# Patient Record
Sex: FEMALE | Race: OTHER | Employment: FULL TIME | URBAN - METROPOLITAN AREA
[De-identification: names, ages, dates, MRNs, and addresses within clinical notes are randomized per-mention and may not be internally consistent; named-entity substitution may affect disease eponyms.]

---

## 2018-07-19 ENCOUNTER — HOSPITAL ENCOUNTER (OUTPATIENT)
Dept: MAMMOGRAPHY | Age: 43
Discharge: HOME | End: 2018-07-19
Attending: INTERNAL MEDICINE
Payer: MEDICAID

## 2018-07-19 ENCOUNTER — OFFICE VISIT (OUTPATIENT)
Dept: INTERNAL MEDICINE CLINIC | Facility: CLINIC | Age: 43
End: 2018-07-19
Payer: MEDICAID

## 2018-07-19 ENCOUNTER — LAB ENCOUNTER (OUTPATIENT)
Dept: LAB | Facility: HOSPITAL | Age: 43
End: 2018-07-19
Attending: INTERNAL MEDICINE
Payer: MEDICAID

## 2018-07-19 VITALS
WEIGHT: 186.69 LBS | SYSTOLIC BLOOD PRESSURE: 112 MMHG | HEART RATE: 83 BPM | BODY MASS INDEX: 32.67 KG/M2 | DIASTOLIC BLOOD PRESSURE: 79 MMHG | HEIGHT: 63.5 IN | TEMPERATURE: 98 F

## 2018-07-19 DIAGNOSIS — Z00.00 ANNUAL PHYSICAL EXAM: Primary | ICD-10-CM

## 2018-07-19 DIAGNOSIS — Z12.4 SCREENING FOR CERVICAL CANCER: ICD-10-CM

## 2018-07-19 DIAGNOSIS — R92.2 DENSE BREAST: ICD-10-CM

## 2018-07-19 DIAGNOSIS — Z12.39 SCREENING FOR BREAST CANCER: ICD-10-CM

## 2018-07-19 PROBLEM — Z80.3 FAMILY HISTORY OF BREAST CANCER IN MOTHER: Status: ACTIVE | Noted: 2018-07-19

## 2018-07-19 LAB
ALBUMIN SERPL BCP-MCNC: 3.9 G/DL (ref 3.5–4.8)
ALBUMIN/GLOB SERPL: 1.1 {RATIO} (ref 1–2)
ALP SERPL-CCNC: 65 U/L (ref 32–100)
ALT SERPL-CCNC: 19 U/L (ref 14–54)
ANION GAP SERPL CALC-SCNC: 6 MMOL/L (ref 0–18)
AST SERPL-CCNC: 21 U/L (ref 15–41)
BASOPHILS # BLD: 0.1 K/UL (ref 0–0.2)
BASOPHILS NFR BLD: 1 %
BILIRUB SERPL-MCNC: 0.5 MG/DL (ref 0.3–1.2)
BUN SERPL-MCNC: 12 MG/DL (ref 8–20)
BUN/CREAT SERPL: 20.7 (ref 10–20)
CALCIUM SERPL-MCNC: 9 MG/DL (ref 8.5–10.5)
CHLORIDE SERPL-SCNC: 104 MMOL/L (ref 95–110)
CHOLEST SERPL-MCNC: 195 MG/DL (ref 110–200)
CO2 SERPL-SCNC: 27 MMOL/L (ref 22–32)
CREAT SERPL-MCNC: 0.58 MG/DL (ref 0.5–1.5)
EOSINOPHIL # BLD: 0.2 K/UL (ref 0–0.7)
EOSINOPHIL NFR BLD: 3 %
ERYTHROCYTE [DISTWIDTH] IN BLOOD BY AUTOMATED COUNT: 13.4 % (ref 11–15)
GLOBULIN PLAS-MCNC: 3.6 G/DL (ref 2.5–3.7)
GLUCOSE SERPL-MCNC: 93 MG/DL (ref 70–99)
HCT VFR BLD AUTO: 41.3 % (ref 35–48)
HDLC SERPL-MCNC: 54 MG/DL
HGB BLD-MCNC: 13.7 G/DL (ref 12–16)
LDLC SERPL CALC-MCNC: 122 MG/DL (ref 0–99)
LYMPHOCYTES # BLD: 1.7 K/UL (ref 1–4)
LYMPHOCYTES NFR BLD: 31 %
MCH RBC QN AUTO: 30.6 PG (ref 27–32)
MCHC RBC AUTO-ENTMCNC: 33.2 G/DL (ref 32–37)
MCV RBC AUTO: 92.1 FL (ref 80–100)
MONOCYTES # BLD: 0.4 K/UL (ref 0–1)
MONOCYTES NFR BLD: 7 %
NEUTROPHILS # BLD AUTO: 3.3 K/UL (ref 1.8–7.7)
NEUTROPHILS NFR BLD: 58 %
NONHDLC SERPL-MCNC: 141 MG/DL
OSMOLALITY UR CALC.SUM OF ELEC: 283 MOSM/KG (ref 275–295)
PATIENT FASTING: YES
PLATELET # BLD AUTO: 219 K/UL (ref 140–400)
PMV BLD AUTO: 9.2 FL (ref 7.4–10.3)
POTASSIUM SERPL-SCNC: 4 MMOL/L (ref 3.3–5.1)
PROT SERPL-MCNC: 7.5 G/DL (ref 5.9–8.4)
RBC # BLD AUTO: 4.48 M/UL (ref 3.7–5.4)
SODIUM SERPL-SCNC: 137 MMOL/L (ref 136–144)
TRIGL SERPL-MCNC: 93 MG/DL (ref 1–149)
TSH SERPL-ACNC: 0.9 UIU/ML (ref 0.45–5.33)
WBC # BLD AUTO: 5.6 K/UL (ref 4–11)

## 2018-07-19 PROCEDURE — 80061 LIPID PANEL: CPT

## 2018-07-19 PROCEDURE — 36415 COLL VENOUS BLD VENIPUNCTURE: CPT

## 2018-07-19 PROCEDURE — 85025 COMPLETE CBC W/AUTO DIFF WBC: CPT

## 2018-07-19 PROCEDURE — 84443 ASSAY THYROID STIM HORMONE: CPT

## 2018-07-19 PROCEDURE — 80053 COMPREHEN METABOLIC PANEL: CPT

## 2018-07-19 PROCEDURE — 77063 BREAST TOMOSYNTHESIS BI: CPT | Performed by: INTERNAL MEDICINE

## 2018-07-19 PROCEDURE — 77067 SCR MAMMO BI INCL CAD: CPT | Performed by: INTERNAL MEDICINE

## 2018-07-19 PROCEDURE — 99386 PREV VISIT NEW AGE 40-64: CPT | Performed by: INTERNAL MEDICINE

## 2018-07-19 NOTE — PROGRESS NOTES
Liv Landry is a 44year old female. Patient presents with:  Physical      HPI:     HPI  Patient is a 46-year-old female here as a new patient for physical and pap. She denies any complaints.   She has history of vitamin D deficiency in the past for wh well-nourished. No distress. HENT:   Head: Normocephalic and atraumatic. Right Ear: External ear normal.   Left Ear: External ear normal.   Nose: Nose normal.   Mouth/Throat: No oropharyngeal exudate.    Eyes: Conjunctivae and EOM are normal. Pupils are Psychiatric: She has a normal mood and affect.          ASSESSMENT AND PLAN:     Problem List Items Addressed This Visit        Other    Annual physical exam - Primary    Relevant Orders    CBC WITH DIFFERENTIAL WITH PLATELET (Completed)    COMP METABOLIC

## 2018-07-23 ENCOUNTER — TELEPHONE (OUTPATIENT)
Dept: OTHER | Age: 43
End: 2018-07-23

## 2018-07-23 DIAGNOSIS — R92.2 DENSE BREAST TISSUE: Primary | ICD-10-CM

## 2018-07-23 NOTE — TELEPHONE ENCOUNTER
Spoke with patient (identified name and ) ,results reviewed and agrees with  Plan. States that she wants to do the [address] for her breasts, Central scheduling  Number given [phone], advised to call tomorrow.     DR MCCAIN=patient wasn't the [place] , pended order

## 2018-07-25 LAB — HPV I/H RISK 1 DNA SPEC QL NAA+PROBE: NEGATIVE

## 2019-05-28 ENCOUNTER — HOSPITAL ENCOUNTER (OUTPATIENT)
Age: 44
Discharge: HOME | End: 2019-05-28
Attending: EMERGENCY MEDICINE
Payer: COMMERCIAL

## 2019-05-28 VITALS
SYSTOLIC BLOOD PRESSURE: 120 MMHG | WEIGHT: 180 LBS | TEMPERATURE: 98 F | RESPIRATION RATE: 17 BRPM | HEART RATE: 92 BPM | HEIGHT: 62 IN | OXYGEN SATURATION: 100 % | DIASTOLIC BLOOD PRESSURE: 83 MMHG | BODY MASS INDEX: 33.13 KG/M2

## 2019-05-28 DIAGNOSIS — L02.91 ABSCESS: Primary | ICD-10-CM

## 2019-05-28 PROCEDURE — 10061 I&D ABSCESS COMP/MULTIPLE: CPT

## 2019-05-28 PROCEDURE — 87184 SC STD DISK METHOD PER PLATE: CPT | Performed by: EMERGENCY MEDICINE

## 2019-05-28 PROCEDURE — 99204 OFFICE O/P NEW MOD 45 MIN: CPT

## 2019-05-28 PROCEDURE — 87205 SMEAR GRAM STAIN: CPT | Performed by: EMERGENCY MEDICINE

## 2019-05-28 PROCEDURE — 87186 SC STD MICRODIL/AGAR DIL: CPT | Performed by: EMERGENCY MEDICINE

## 2019-05-28 PROCEDURE — 87070 CULTURE OTHR SPECIMN AEROBIC: CPT | Performed by: EMERGENCY MEDICINE

## 2019-05-28 PROCEDURE — 99214 OFFICE O/P EST MOD 30 MIN: CPT

## 2019-05-28 PROCEDURE — 87077 CULTURE AEROBIC IDENTIFY: CPT | Performed by: EMERGENCY MEDICINE

## 2019-05-28 RX ORDER — NAPROXEN 500 MG/1
500 TABLET ORAL 2 TIMES DAILY WITH MEALS
Qty: 20 TABLET | Refills: 0 | Status: SHIPPED | OUTPATIENT
Start: 2019-05-28 | End: 2019-06-04

## 2019-05-28 RX ORDER — CLINDAMYCIN HYDROCHLORIDE 300 MG/1
300 CAPSULE ORAL 3 TIMES DAILY
Qty: 30 CAPSULE | Refills: 0 | Status: SHIPPED | OUTPATIENT
Start: 2019-05-28 | End: 2019-06-07

## 2019-05-28 NOTE — ED PROVIDER NOTES
Patient Seen in: [facility] In [address] [place] [address]    History   Patient presents with:  Abscess (integumentary)    Stated Complaint: back abscess    HPI  Patient complains of skin infection for 7 days. Located on back.   Describes as painful cooperative,          ED Course     Labs Reviewed   AEROBIC BACTERIAL CULTURE       MDM             Procedures:    Abscess drainage: The patient abscess was located right upper back.    I obtained verbal consent from the patient to drain the abscess who wa

## 2019-05-30 ENCOUNTER — HOSPITAL ENCOUNTER (OUTPATIENT)
Age: 44
Discharge: HOME | End: 2019-05-30
Attending: EMERGENCY MEDICINE
Payer: COMMERCIAL

## 2019-05-30 VITALS
HEART RATE: 84 BPM | RESPIRATION RATE: 20 BRPM | HEIGHT: 63 IN | OXYGEN SATURATION: 98 % | SYSTOLIC BLOOD PRESSURE: 110 MMHG | BODY MASS INDEX: 31.89 KG/M2 | DIASTOLIC BLOOD PRESSURE: 76 MMHG | TEMPERATURE: 98 F | WEIGHT: 180 LBS

## 2019-05-30 DIAGNOSIS — Z51.89 WOUND CHECK, ABSCESS: Primary | ICD-10-CM

## 2019-05-30 PROCEDURE — 99211 OFF/OP EST MAY X REQ PHY/QHP: CPT

## 2019-05-31 NOTE — ED PROVIDER NOTES
Patient Seen in: [facility] In [address] [place] [address]    History   Patient presents with:  Laceration Abrasion (integumentary)    Stated Complaint: wound check    HPI  Patient complains of skin infection for  6 days. Located back.  History revie intact redness still there  PSYCH: calm, cooperative,          ED Course   Labs Reviewed - No data to display    MDM             Procedures:    Abscess unpacked            Disposition and Plan     Clinical Impression:  Wound check, abscess  (primary encoun

## 2024-12-10 ENCOUNTER — LAB ENCOUNTER (OUTPATIENT)
Dept: LAB | Age: 49
End: 2024-12-10
Attending: FAMILY MEDICINE
Payer: COMMERCIAL

## 2024-12-10 ENCOUNTER — OFFICE VISIT (OUTPATIENT)
Dept: FAMILY MEDICINE CLINIC | Facility: CLINIC | Age: 49
End: 2024-12-10
Payer: COMMERCIAL

## 2024-12-10 VITALS
HEART RATE: 103 BPM | BODY MASS INDEX: 27.82 KG/M2 | HEIGHT: 63 IN | WEIGHT: 157 LBS | SYSTOLIC BLOOD PRESSURE: 122 MMHG | DIASTOLIC BLOOD PRESSURE: 81 MMHG

## 2024-12-10 DIAGNOSIS — Z11.3 SCREENING EXAMINATION FOR STD (SEXUALLY TRANSMITTED DISEASE): ICD-10-CM

## 2024-12-10 DIAGNOSIS — Z80.3 FAMILY HISTORY OF BREAST CANCER IN MOTHER: ICD-10-CM

## 2024-12-10 DIAGNOSIS — Z00.00 WELL ADULT EXAM: ICD-10-CM

## 2024-12-10 DIAGNOSIS — Z12.31 ENCOUNTER FOR SCREENING MAMMOGRAM FOR BREAST CANCER: ICD-10-CM

## 2024-12-10 DIAGNOSIS — N93.9 VAGINAL BLEEDING: Primary | ICD-10-CM

## 2024-12-10 DIAGNOSIS — Z01.419 ENCOUNTER FOR GYNECOLOGICAL EXAMINATION: ICD-10-CM

## 2024-12-10 DIAGNOSIS — N93.0 POSTCOITAL BLEEDING: ICD-10-CM

## 2024-12-10 DIAGNOSIS — Z80.3 FH: BREAST CANCER IN FIRST DEGREE RELATIVE: ICD-10-CM

## 2024-12-10 LAB
ALBUMIN SERPL-MCNC: 4.6 G/DL (ref 3.2–4.8)
ALBUMIN/GLOB SERPL: 1.6 {RATIO} (ref 1–2)
ALP LIVER SERPL-CCNC: 66 U/L
ALT SERPL-CCNC: 22 U/L
ANION GAP SERPL CALC-SCNC: 6 MMOL/L (ref 0–18)
AST SERPL-CCNC: 23 U/L (ref ?–34)
BASOPHILS # BLD AUTO: 0.07 X10(3) UL (ref 0–0.2)
BASOPHILS NFR BLD AUTO: 1 %
BILIRUB SERPL-MCNC: 0.4 MG/DL (ref 0.3–1.2)
BUN BLD-MCNC: 10 MG/DL (ref 9–23)
BUN/CREAT SERPL: 13.9 (ref 10–20)
CALCIUM BLD-MCNC: 9.6 MG/DL (ref 8.7–10.4)
CHLORIDE SERPL-SCNC: 108 MMOL/L (ref 98–112)
CHOLEST SERPL-MCNC: 181 MG/DL (ref ?–200)
CO2 SERPL-SCNC: 27 MMOL/L (ref 21–32)
CREAT BLD-MCNC: 0.72 MG/DL
DEPRECATED RDW RBC AUTO: 46.1 FL (ref 35.1–46.3)
EGFRCR SERPLBLD CKD-EPI 2021: 103 ML/MIN/1.73M2 (ref 60–?)
EOSINOPHIL # BLD AUTO: 0.35 X10(3) UL (ref 0–0.7)
EOSINOPHIL NFR BLD AUTO: 4.8 %
ERYTHROCYTE [DISTWIDTH] IN BLOOD BY AUTOMATED COUNT: 13.3 % (ref 11–15)
FASTING PATIENT LIPID ANSWER: NO
FASTING STATUS PATIENT QL REPORTED: NO
GLOBULIN PLAS-MCNC: 2.9 G/DL (ref 2–3.5)
GLUCOSE BLD-MCNC: 85 MG/DL (ref 70–99)
HCT VFR BLD AUTO: 39.1 %
HDLC SERPL-MCNC: 67 MG/DL (ref 40–59)
HGB BLD-MCNC: 13.1 G/DL
IMM GRANULOCYTES # BLD AUTO: 0.02 X10(3) UL (ref 0–1)
IMM GRANULOCYTES NFR BLD: 0.3 %
LDLC SERPL CALC-MCNC: 94 MG/DL (ref ?–100)
LYMPHOCYTES # BLD AUTO: 2.18 X10(3) UL (ref 1–4)
LYMPHOCYTES NFR BLD AUTO: 29.7 %
MCH RBC QN AUTO: 31.4 PG (ref 26–34)
MCHC RBC AUTO-ENTMCNC: 33.5 G/DL (ref 31–37)
MCV RBC AUTO: 93.8 FL
MONOCYTES # BLD AUTO: 0.54 X10(3) UL (ref 0.1–1)
MONOCYTES NFR BLD AUTO: 7.4 %
NEUTROPHILS # BLD AUTO: 4.17 X10 (3) UL (ref 1.5–7.7)
NEUTROPHILS # BLD AUTO: 4.17 X10(3) UL (ref 1.5–7.7)
NEUTROPHILS NFR BLD AUTO: 56.8 %
NONHDLC SERPL-MCNC: 114 MG/DL (ref ?–130)
OSMOLALITY SERPL CALC.SUM OF ELEC: 290 MOSM/KG (ref 275–295)
PLATELET # BLD AUTO: 263 10(3)UL (ref 150–450)
POTASSIUM SERPL-SCNC: 4.1 MMOL/L (ref 3.5–5.1)
PROT SERPL-MCNC: 7.5 G/DL (ref 5.7–8.2)
RBC # BLD AUTO: 4.17 X10(6)UL
SODIUM SERPL-SCNC: 141 MMOL/L (ref 136–145)
TRIGL SERPL-MCNC: 115 MG/DL (ref 30–149)
TSI SER-ACNC: 0.09 UIU/ML (ref 0.55–4.78)
VLDLC SERPL CALC-MCNC: 19 MG/DL (ref 0–30)
WBC # BLD AUTO: 7.3 X10(3) UL (ref 4–11)

## 2024-12-10 PROCEDURE — 85025 COMPLETE CBC W/AUTO DIFF WBC: CPT

## 2024-12-10 PROCEDURE — 83036 HEMOGLOBIN GLYCOSYLATED A1C: CPT

## 2024-12-10 PROCEDURE — 84481 FREE ASSAY (FT-3): CPT

## 2024-12-10 PROCEDURE — 80053 COMPREHEN METABOLIC PANEL: CPT

## 2024-12-10 PROCEDURE — 80061 LIPID PANEL: CPT

## 2024-12-10 PROCEDURE — 36415 COLL VENOUS BLD VENIPUNCTURE: CPT

## 2024-12-10 PROCEDURE — 84443 ASSAY THYROID STIM HORMONE: CPT

## 2024-12-10 PROCEDURE — 84439 ASSAY OF FREE THYROXINE: CPT

## 2024-12-10 PROCEDURE — 99204 OFFICE O/P NEW MOD 45 MIN: CPT | Performed by: FAMILY MEDICINE

## 2024-12-10 RX ORDER — PHENTERMINE HYDROCHLORIDE 30 MG/1
30 CAPSULE ORAL EVERY MORNING
COMMUNITY

## 2024-12-10 NOTE — PROGRESS NOTES
HPI:    Patient ID: Kit Gomez is a 49 year old female.      Menstrual Problem  The patient's pertinent negatives include no pelvic pain or vaginal discharge.       Chief Complaint   Patient presents with    Menstrual Problem     Irregular spotting states she gets a full period and then  spotting also spotting after intercourse X 3 months          Wt Readings from Last 6 Encounters:   12/10/24 157 lb (71.2 kg)   05/30/19 180 lb (81.6 kg)   05/28/19 180 lb (81.6 kg)   07/19/18 186 lb 11.2 oz (84.7 kg)     BP Readings from Last 3 Encounters:   12/10/24 122/81   05/30/19 110/76   05/28/19 120/83     Patient new to me   Referred to me from her sister.  Has not seen pcp for a while    In a relationship  3 kids- 23, 20,11 yrs.    Non smoker    Patient thinks maybe ? Perimenopausal  Having  irregular menses since 3 months  Would get full menses, lasting 5 days, was getting spotting between.    Gets spotting after sexual intercourse recently    Hx of abnormal paps maybe 2010  Last pap 8 yrs,    Taking a diet pill      Review of Systems   Genitourinary:  Positive for menstrual problem and vaginal bleeding. Negative for pelvic pain, vaginal discharge and vaginal pain.       /81   Pulse 103   Ht 5' 3\" (1.6 m)   Wt 157 lb (71.2 kg)   LMP 11/18/2024 (Exact Date)   BMI 27.81 kg/m²          Current Outpatient Medications   Medication Sig Dispense Refill    Phentermine HCl 30 MG Oral Cap Take 1 capsule (30 mg total) by mouth every morning.       Allergies:Allergies[1]   PHYSICAL EXAM:     Chief Complaint   Patient presents with    Menstrual Problem     Irregular spotting states she gets a full period and then  spotting also spotting after intercourse X 3 months         Physical Exam  Constitutional:       Appearance: She is well-developed.   Neck:      Thyroid: No thyromegaly.   Cardiovascular:      Rate and Rhythm: Normal rate and regular rhythm.      Heart sounds: Normal heart sounds.   Chest:   Breasts:      Breasts are symmetrical.      Right: Normal.      Left: Normal.   Abdominal:      General: Bowel sounds are normal.      Palpations: Abdomen is soft.   Genitourinary:     Labia:         Right: No rash, tenderness, lesion or injury.         Left: No rash, tenderness, lesion or injury.       Vagina: Normal. No vaginal discharge.      Cervix: Friability and cervical bleeding present. No cervical motion tenderness, discharge, lesion, erythema or eversion.      Adnexa:         Right: No mass, tenderness or fullness.          Left: No mass, tenderness or fullness.        Comments: Pap done  Musculoskeletal:      Cervical back: Normal range of motion and neck supple.   Lymphadenopathy:      Upper Body:      Right upper body: No supraclavicular or axillary adenopathy.      Left upper body: No supraclavicular or axillary adenopathy.                ASSESSMENT/PLAN:     Encounter Diagnoses   Name Primary?    Vaginal bleeding Yes    Postcoital bleeding     Family history of breast cancer in mother     FH: breast cancer in first degree relative     Encounter for screening mammogram for breast cancer     Encounter for gynecological examination     Well adult exam     Screening examination for STD (sexually transmitted disease)        1. Vaginal bleeding    - US PELVIS (TRANSABDOMINAL AND TRANSVAGINAL) (CPT=76856/38744); Future    2. Postcoital bleeding    - US PELVIS (TRANSABDOMINAL AND TRANSVAGINAL) (CPT=76856/19358); Future    3. Family history of breast cancer in mother      4. FH: breast cancer in first degree relative      5. Encounter for screening mammogram for breast cancer    - [facility] FIDENCIO 2D+3D SCREENING BILAT (CPT=77067/88473); Future    6. Encounter for gynecological examination  Pelvic and breast exam done  - ThinPrep PAP with HPV Reflex Request B; Future    7. Well adult exam  Follow up physical   - CBC With Differential With Platelet; Future  - Comp Metabolic Panel (14); Future  - Lipid Panel; Future  - TSH W Reflex  To Free T4; Future  - Hemoglobin A1C; Future    8. Screening examination for STD (sexually transmitted disease)    - Chlamydia/Gc Amplification; Future      Orders Placed This Encounter   Procedures    CBC With Differential With Platelet    Comp Metabolic Panel (14)    Lipid Panel    TSH W Reflex To Free T4    Hemoglobin A1C    ThinPrep PAP with HPV Reflex Request B    Chlamydia/Gc Amplification         The above note was creating using Dragon speech recognition technology. Please excuse any typos    Meds This Visit:  Requested Prescriptions      No prescriptions requested or ordered in this encounter       Imaging & Referrals:  US PELVIS (TRANSABDOMINAL AND TRANSVAGINAL) (CPT=76856/34723)  [facility] FDIENCIO 2D+3D SCREENING BILAT (CPT=77067/81017)       ID#[id number]       [1] No Known Allergies

## 2024-12-11 LAB
C TRACH DNA SPEC QL NAA+PROBE: NEGATIVE
EST. AVERAGE GLUCOSE BLD GHB EST-MCNC: 105 MG/DL (ref 68–126)
HBA1C MFR BLD: 5.3 % (ref ?–5.7)
N GONORRHOEA DNA SPEC QL NAA+PROBE: NEGATIVE
T3FREE SERPL-MCNC: 3.36 PG/ML (ref 2.4–4.2)
T4 FREE SERPL-MCNC: 1.1 NG/DL (ref 0.8–1.7)

## 2024-12-12 LAB
HPV E6+E7 MRNA CVX QL NAA+PROBE: NEGATIVE
LAST PAP RESULT: NORMAL

## 2024-12-17 DIAGNOSIS — R79.89 ABNORMAL TSH: Primary | ICD-10-CM

## 2025-01-09 ENCOUNTER — OFFICE VISIT (OUTPATIENT)
Dept: FAMILY MEDICINE CLINIC | Facility: CLINIC | Age: 50
End: 2025-01-09
Payer: COMMERCIAL

## 2025-01-09 VITALS
TEMPERATURE: 97 F | SYSTOLIC BLOOD PRESSURE: 143 MMHG | HEIGHT: 63 IN | HEART RATE: 98 BPM | WEIGHT: 154 LBS | BODY MASS INDEX: 27.29 KG/M2 | DIASTOLIC BLOOD PRESSURE: 83 MMHG

## 2025-01-09 DIAGNOSIS — F43.29 STRESS AND ADJUSTMENT REACTION: ICD-10-CM

## 2025-01-09 DIAGNOSIS — Z00.00 WELL ADULT EXAM: Primary | ICD-10-CM

## 2025-01-09 DIAGNOSIS — R03.0 ELEVATED BLOOD PRESSURE READING: ICD-10-CM

## 2025-01-09 DIAGNOSIS — Z12.11 COLON CANCER SCREENING: ICD-10-CM

## 2025-01-09 DIAGNOSIS — R00.2 PALPITATIONS: ICD-10-CM

## 2025-01-09 DIAGNOSIS — R79.89 LOW TSH LEVEL: ICD-10-CM

## 2025-01-09 DIAGNOSIS — Z23 NEED FOR TDAP VACCINATION: ICD-10-CM

## 2025-01-09 PROCEDURE — 90715 TDAP VACCINE 7 YRS/> IM: CPT | Performed by: FAMILY MEDICINE

## 2025-01-09 PROCEDURE — 99396 PREV VISIT EST AGE 40-64: CPT | Performed by: FAMILY MEDICINE

## 2025-01-09 PROCEDURE — 90471 IMMUNIZATION ADMIN: CPT | Performed by: FAMILY MEDICINE

## 2025-01-09 PROCEDURE — 99213 OFFICE O/P EST LOW 20 MIN: CPT | Performed by: FAMILY MEDICINE

## 2025-01-09 NOTE — PROGRESS NOTES
HPI:    Patient ID: Kit Gomez is a 50 year old female.    Anxiety  Pertinent negatives include no abdominal pain, arthralgias, chest pain, chills, congestion, coughing, fatigue, fever, headaches, myalgias, nausea, numbness, rash, sore throat, vomiting or weakness.     Chief Complaint   Patient presents with    Routine Physical    Anxiety     Sometimes feels rapid heart beat        Wt Readings from Last 6 Encounters:   01/09/25 154 lb (69.9 kg)   12/10/24 157 lb (71.2 kg)   05/30/19 180 lb (81.6 kg)   05/28/19 180 lb (81.6 kg)   07/19/18 186 lb 11.2 oz (84.7 kg)     BP Readings from Last 3 Encounters:   01/09/25 143/83   12/10/24 122/81   05/30/19 110/76     Feels sometimes her heart races.  Phentermine daily since sept.  States she got this from [place]    Last labs - tsh was low  Thyroid problems - mother and niece       Review of Systems   Constitutional:  Negative for activity change, appetite change, chills, fatigue, fever and unexpected weight change.   HENT:  Negative for congestion, dental problem, drooling, ear discharge, ear pain, facial swelling, hearing loss, mouth sores, nosebleeds, postnasal drip, rhinorrhea, sinus pressure, sinus pain, sneezing, sore throat, tinnitus, trouble swallowing and voice change.    Eyes:  Negative for pain, discharge, redness and visual disturbance.   Respiratory:  Negative for cough, shortness of breath and wheezing.    Cardiovascular:  Positive for palpitations. Negative for chest pain and leg swelling.   Gastrointestinal:  Negative for abdominal pain, anal bleeding, blood in stool, constipation, diarrhea, nausea, rectal pain and vomiting.   Endocrine: Negative for cold intolerance, heat intolerance, polydipsia, polyphagia and polyuria.   Genitourinary:  Negative for decreased urine volume, difficulty urinating, dysuria, flank pain, frequency, menstrual problem, pelvic pain, urgency, vaginal bleeding, vaginal discharge and vaginal pain.        Irregular  menses       Musculoskeletal:  Negative for arthralgias, back pain and myalgias.   Skin:  Negative for rash.   Neurological:  Negative for dizziness, seizures, syncope, weakness, numbness and headaches.   Hematological:  Does not bruise/bleed easily.   Psychiatric/Behavioral:  Negative for behavioral problems, decreased concentration, self-injury, sleep disturbance and suicidal ideas. The patient is not nervous/anxious.        /83   Pulse 98   Temp 97.2 °F (36.2 °C) (Temporal)   Ht 5' 3\" (1.6 m)   Wt 154 lb (69.9 kg)   LMP 2025 (Exact Date)   BMI 27.28 kg/m²     History reviewed. No pertinent past medical history.  Past Surgical History:   Procedure Laterality Date          Cholecystectomy       Social History     Socioeconomic History    Marital status:      Spouse name: Not on file    Number of children: Not on file    Years of education: Not on file    Highest education level: Not on file   Occupational History    Not on file   Tobacco Use    Smoking status: Never    Smokeless tobacco: Never   Vaping Use    Vaping status: Never Used   Substance and Sexual Activity    Alcohol use: Yes     Comment: occ    Drug use: Never    Sexual activity: Not on file   Other Topics Concern    Not on file   Social History Narrative    Not on file     Social Drivers of Health     Financial Resource Strain: Not on file   Food Insecurity: No Food Insecurity (2025)    NCSS - Food Insecurity     Worried About Running Out of Food in the Last Year: No     Ran Out of Food in the Last Year: No   Transportation Needs: No Transportation Needs (2025)    NCSS - Transportation     Lack of Transportation: No   Physical Activity: Not on file   Stress: Not on file   Social Connections: Not on file   Housing Stability: Not At Risk (2025)    NCSS - Housing/Utilities     Has Housing: Yes     Worried About Losing Housing: No     Unable to Get Utilities: No     Family History   Problem Relation Age of Onset    Cancer  Father         skin    Cancer Mother         breast    Breast Cancer Mother 55    Other (double masectomy) Sister        Immunization History   Administered Date(s) Administered    Covid-19 Vaccine Pfizer 30 mcg/0.3 ml 04/24/2021, 05/15/2021       Health Maintenance   Topic Date Due    Colorectal Cancer Screening  Never done    DTaP,Tdap,and Td Vaccines (1 - Tdap) Never done    Annual Physical  07/19/2019    Mammogram  07/19/2019    COVID-19 Vaccine (3 - 2024-25 season) 09/01/2024    Influenza Vaccine (1) Never done    HTN: BP Follow-Up  02/09/2025    Zoster Vaccines (1 of 2) 01/04/2026    Pap Smear  12/10/2027    Annual Depression Screening  Completed    Pneumococcal Vaccine: Birth to 50yrs  Aged Out    Meningococcal B Vaccine  Aged Out          Current Outpatient Medications   Medication Sig Dispense Refill    Phentermine HCl 30 MG Oral Cap Take 1 capsule (30 mg total) by mouth every morning.       Allergies:Allergies[1]   PHYSICAL EXAM:     Chief Complaint   Patient presents with    Routine Physical    Anxiety     Sometimes feels rapid heart beat       Physical Exam  Vitals and nursing note reviewed.   Constitutional:       Appearance: She is well-developed.   HENT:      Head: Normocephalic and atraumatic.      Right Ear: External ear normal.      Left Ear: External ear normal.      Nose: Nose normal.      Mouth/Throat:      Pharynx: No oropharyngeal exudate.   Eyes:      General:         Right eye: No discharge.         Left eye: No discharge.      Conjunctiva/sclera: Conjunctivae normal.      Pupils: Pupils are equal, round, and reactive to light.   Neck:      Thyroid: No thyromegaly.   Cardiovascular:      Rate and Rhythm: Normal rate and regular rhythm.      Heart sounds: Normal heart sounds. No murmur heard.  Pulmonary:      Effort: Pulmonary effort is normal.      Breath sounds: Normal breath sounds. No wheezing.   Abdominal:      General: Bowel sounds are normal.      Palpations: Abdomen is soft. There is  no mass.      Tenderness: There is no abdominal tenderness.   Musculoskeletal:         General: No tenderness.      Cervical back: Normal range of motion and neck supple.   Lymphadenopathy:      Cervical: No cervical adenopathy.   Skin:     General: Skin is dry.      Findings: No rash.   Neurological:      Mental Status: She is alert and oriented to person, place, and time.      Cranial Nerves: No cranial nerve deficit.      Motor: No abnormal muscle tone.      Coordination: Coordination normal.      Deep Tendon Reflexes: Reflexes are normal and symmetric. Reflexes normal.   Psychiatric:         Behavior: Behavior normal.         Thought Content: Thought content normal.         Judgment: Judgment normal.      Comments: Appears stressed                ASSESSMENT/PLAN:     Return yearly for physicals  Follow up with dentist every 6 months  Follow up with eye doctor yearly  Recommend aerobic exercise for at least 30mins 5 days a week  Yearly flu shot  Tetanus booster every 10 years (Tdap/ Td)  Labs ordered/ or reviewed if done prior to appointment     Encounter Diagnoses   Name Primary?    Well adult exam Yes    Colon cancer screening     Encounter for gynecological examination     Low TSH level     Need for Tdap vaccination     Elevated blood pressure reading     Stress and adjustment reaction     Palpitations        1. Well adult exam  Labs reviewed    2. Colon cancer screening    - Gastro GI Telephone Colon Screen; Future        4. Low TSH level  Repeat 1-2 weeks  Stop phentermine    - TSH W Reflex To Free T4; Future    5. Need for Tdap vaccination    - TETANUS, DIPHTHERIA TOXOIDS AND ACELLULAR PERTUSIS VACCINE (TDAP), >7 YEARS, IM USE    6. Elevated blood pressure reading  Stop phentermine   Low salt diet (less than 2.5 grams/2500mg's)  Exercise for 30mins most days of the week  Wt loss: 1/2 lb per week: goal 10-15lbs  Buy blood pressure cuff  Take blood pressure daily and keep a log  No caffeine/alcohol  Call if  blood pressure greater than 140/90  Follow up 2 weeks   Call/return with concerns.    - EKG 12 Lead; Future    7. Stress and adjustment reaction    [facility]    8. Palpitations  Stop phentermine/ stimulants/ caffeine  Follow up 2 weeks  - EKG 12 Lead; Future      Orders Placed This Encounter   Procedures    TSH W Reflex To Free T4    TETANUS, DIPHTHERIA TOXOIDS AND ACELLULAR PERTUSIS VACCINE (TDAP), >7 YEARS, IM USE       The above note was creating using Dragon speech recognition technology. Please excuse any typos    Meds This Visit:  Requested Prescriptions      No prescriptions requested or ordered in this encounter       Imaging & Referrals:  TETANUS, DIPHTHERIA TOXOIDS AND ACELLULAR PERTUSIS VACCINE (TDAP), >7 YEARS, IM USE  [facility]  OP REFERRAL TO [facility] TELEPHONE COLON SCREEN  EKG 12-LEAD       ID#[id number]       [1] No Known Allergies

## 2025-01-16 ENCOUNTER — HOSPITAL ENCOUNTER (OUTPATIENT)
Dept: ULTRASOUND IMAGING | Facility: HOSPITAL | Age: 50
Discharge: HOME | End: 2025-01-16
Attending: FAMILY MEDICINE
Payer: COMMERCIAL

## 2025-01-16 DIAGNOSIS — N93.0 POSTCOITAL BLEEDING: ICD-10-CM

## 2025-01-16 DIAGNOSIS — N93.9 VAGINAL BLEEDING: ICD-10-CM

## 2025-01-16 PROCEDURE — 76830 TRANSVAGINAL US NON-OB: CPT | Performed by: FAMILY MEDICINE

## 2025-01-16 PROCEDURE — 76856 US EXAM PELVIC COMPLETE: CPT | Performed by: FAMILY MEDICINE

## 2025-01-27 ENCOUNTER — HOSPITAL ENCOUNTER (OUTPATIENT)
Dept: MAMMOGRAPHY | Age: 50
Discharge: HOME | End: 2025-01-27
Attending: FAMILY MEDICINE
Payer: COMMERCIAL

## 2025-01-27 DIAGNOSIS — Z12.31 ENCOUNTER FOR SCREENING MAMMOGRAM FOR BREAST CANCER: ICD-10-CM

## 2025-01-27 PROCEDURE — 77067 SCR MAMMO BI INCL CAD: CPT | Performed by: FAMILY MEDICINE

## 2025-01-27 PROCEDURE — 77063 BREAST TOMOSYNTHESIS BI: CPT | Performed by: FAMILY MEDICINE

## 2025-01-30 ENCOUNTER — LAB ENCOUNTER (OUTPATIENT)
Dept: LAB | Age: 50
End: 2025-01-30
Attending: FAMILY MEDICINE
Payer: COMMERCIAL

## 2025-01-30 DIAGNOSIS — R00.2 PALPITATIONS: ICD-10-CM

## 2025-01-30 DIAGNOSIS — R79.89 ABNORMAL TSH: ICD-10-CM

## 2025-01-30 DIAGNOSIS — R03.0 ELEVATED BLOOD PRESSURE READING: ICD-10-CM

## 2025-01-30 DIAGNOSIS — R79.89 LOW TSH LEVEL: ICD-10-CM

## 2025-01-30 LAB
ATRIAL RATE: 99 BPM
P AXIS: 47 DEGREES
P-R INTERVAL: 134 MS
Q-T INTERVAL: 328 MS
QRS DURATION: 82 MS
QTC CALCULATION (BEZET): 420 MS
R AXIS: 58 DEGREES
T AXIS: 37 DEGREES
T4 FREE SERPL-MCNC: 1.4 NG/DL (ref 0.8–1.7)
TSI SER-ACNC: <0.01 UIU/ML (ref 0.55–4.78)
VENTRICULAR RATE: 99 BPM

## 2025-01-30 PROCEDURE — 84443 ASSAY THYROID STIM HORMONE: CPT

## 2025-01-30 PROCEDURE — 84439 ASSAY OF FREE THYROXINE: CPT

## 2025-01-30 PROCEDURE — 84481 FREE ASSAY (FT-3): CPT

## 2025-01-30 PROCEDURE — 36415 COLL VENOUS BLD VENIPUNCTURE: CPT

## 2025-01-30 PROCEDURE — 93005 ELECTROCARDIOGRAM TRACING: CPT

## 2025-01-30 PROCEDURE — 93010 ELECTROCARDIOGRAM REPORT: CPT | Performed by: INTERNAL MEDICINE

## 2025-01-31 LAB — T3FREE SERPL-MCNC: 3.01 PG/ML (ref 2.4–4.2)

## 2025-02-02 DIAGNOSIS — R79.89 LOW TSH LEVEL: Primary | ICD-10-CM

## 2025-02-02 PROBLEM — Z12.39 SCREENING FOR BREAST CANCER: Status: RESOLVED | Noted: 2018-07-19 | Resolved: 2025-02-02

## 2025-02-02 PROBLEM — Z00.00 ANNUAL PHYSICAL EXAM: Status: RESOLVED | Noted: 2018-07-19 | Resolved: 2025-02-02

## 2025-02-02 PROBLEM — Z12.4 SCREENING FOR CERVICAL CANCER: Status: RESOLVED | Noted: 2018-07-19 | Resolved: 2025-02-02

## 2025-02-03 ENCOUNTER — TELEPHONE (OUTPATIENT)
Dept: FAMILY MEDICINE CLINIC | Facility: CLINIC | Age: 50
End: 2025-02-03

## 2025-02-03 NOTE — TELEPHONE ENCOUNTER
Dr Dennison ===patient is asking if it is ok to continue taking the antibiotic since you recommend stopping the phentermine due to overactive thyroid .     Per patient, she is currently taking Amoxicillin,for her ear infection that was  prescribed  by a different provider.   She still has 2 days left .       See lab results 1/30/25...

## 2025-02-03 NOTE — TELEPHONE ENCOUNTER
Dr Dennison =is it ok to wait until May or send a request to the endocrinology department for an earlier appointment ? Thanks.       Informed the patient  regarding the antibiotic question.   Per patient,  she called endocrinology and they gave her the earliest appointment in May.        Future Appointments   Date Time Provider Department Center   5/5/2025  5:00 PM Alsayed, Mahmoud, MD [facility]

## 2025-02-04 NOTE — TELEPHONE ENCOUNTER
Was able to find an appt 4/1/25, offered to pt, pt agreed. Scheduled appt.  Also added pt to waitlist for 2 providers for 30 min slot.     Will keep a lookout for earlier cancellations.

## 2025-02-24 ENCOUNTER — PATIENT MESSAGE (OUTPATIENT)
Dept: FAMILY MEDICINE CLINIC | Facility: CLINIC | Age: 50
End: 2025-02-24

## 2025-02-24 ENCOUNTER — NURSE TRIAGE (OUTPATIENT)
Dept: FAMILY MEDICINE CLINIC | Facility: CLINIC | Age: 50
End: 2025-02-24

## 2025-02-24 NOTE — TELEPHONE ENCOUNTER
Action Requested: Summary for Provider     []  Critical Lab, Recommendations Needed  [] Need Additional Advice  []   FYI    []   Need Orders  [] Need Medications Sent to Pharmacy  []  Other     SUMMARY: As per protocol, appointment made within 2 weeks.    Future Appointments   Date Time Provider Department Center   2/27/2025 11:00 AM Elsa Dennison MD [facility]   4/1/2025  8:00 AM Leno Nolasco MD [facility]     Reason for call: Anxiety  Onset: After January office visit      Patient states she has been feeling anxiety. Patient had appointment and blood pressure high at that time, blood work done. Patient states she was told her thyroid function was low. She has an appointment with Endocrinology in April. She stopped her diet pills but then gained 15 pounds, had been feeling nauseas, dizzy at times.  She feels this is all contributing to her anxiety.    Care Advice    Patient/Caregiver understands and will follow care advice?: Yes, plans to follow advice           Anxiety and Panic [facility]  Bella SMITH Mon Feb 24, 2025 03:43 PM      Disposition and First Aid       SEE IN OFFICE WITHIN 2 WEEKS:   * You need to be examined.   * Let me give you an appointment.              Anxiety Symptoms       AVOID CAFFEINE:  * Avoid caffeine-containing beverages. It is a stimulant and can aggravate anxiety.  * Examples include coffee, tea, girma.    AVOID TRIGGERS OF ANXIETY:  * Limit your alcohol. Men should have 2 or less drinks per day. Women should have 1 or less drinks per day. After dinner drinking causes insomnia 3 to 4 hours after falling asleep.  * If you do smoke, try to stop, or at least cut back. Nicotine is a stimulant.  * Avoid diet pills. They act as stimulants.  * Talk to your doctor (or NP/PA)before taking any herbal supplements. Reason: Some have side effects.    STRESS REDUCTION:  * Try a calming activity. Here are some examples: go for a daily walk, spend time with friends or family,  read a book.  * Take regular breaks throughout the day.  * Occasionally pamper yourself.  * Take a class on stress management.  * Learn relaxation technique such as meditation or yoga.    CALL BACK IF:  * Anxiety or panic attacks continue  * You feel like harming yourself  * You become worse                            Patient voices understanding and agrees to plan of care.      [employer] message:  Dr Dennison, I'm feeling a lot of anxiety. I find that sometimes even when I talk I'm not making sense. I feel a rapid heart beat and I'm so nauseated at times. I have been drinking more alcohol than usual to calm myself. Can you prescribe anything for anxiety? The endocrinologist can't see me until April, regarding my thyroid issues. I spoke to the therapist and she just have me other places to call to get help for life stressors. Thank you     Reason for Disposition   Symptoms of anxiety or panic attack and is a chronic symptom (recurrent or ongoing AND present > 4 weeks)    Protocols used: Anxiety and Panic Attack[facility]

## 2025-03-24 DIAGNOSIS — F41.9 ANXIETY DISORDER, UNSPECIFIED TYPE: ICD-10-CM

## 2025-03-27 RX ORDER — ESCITALOPRAM OXALATE 5 MG/1
5 TABLET ORAL DAILY
Qty: 30 TABLET | Refills: 0 | OUTPATIENT
Start: 2025-03-27

## 2025-04-22 DIAGNOSIS — R00.2 PALPITATIONS: ICD-10-CM

## 2025-04-25 NOTE — TELEPHONE ENCOUNTER
Please review.  Protocol failed/has no protocol.    Last Office Visit: Telemedicine 03/25/2025  Requested Prescriptions     Pending Prescriptions Disp Refills    PROPRANOLOL 10 MG Oral Tab [Pharmacy Med Name: PROPRANOLOL 10MG TABLETS] 180 tablet 0     Sig: TAKE 1 TABLET(10 MG) BY MOUTH TWICE DAILY     Please see 03/25/2025 Tele note :    3. Palpitations  Recommend in the meantime for symptomatic treatment with propranolol for heart racing.  Follow up 2 months   - propranolol 10 MG Oral Tab; Take 1 tablet (10 mg total) by mouth 2 (two) times daily.  Dispense: 60 tablet; Refill: 0

## 2025-04-27 RX ORDER — PROPRANOLOL HYDROCHLORIDE 10 MG/1
10 TABLET ORAL 2 TIMES DAILY
Qty: 180 TABLET | Refills: 0 | Status: SHIPPED | OUTPATIENT
Start: 2025-04-27

## 2025-04-28 ENCOUNTER — LAB ENCOUNTER (OUTPATIENT)
Dept: LAB | Age: 50
End: 2025-04-28
Attending: FAMILY MEDICINE
Payer: COMMERCIAL

## 2025-04-28 DIAGNOSIS — R79.89 LOW TSH LEVEL: ICD-10-CM

## 2025-04-28 LAB
T4 FREE SERPL-MCNC: 2 NG/DL (ref 0.8–1.7)
TSI SER-ACNC: <0.01 UIU/ML (ref 0.55–4.78)

## 2025-04-28 PROCEDURE — 36415 COLL VENOUS BLD VENIPUNCTURE: CPT

## 2025-04-28 PROCEDURE — 84443 ASSAY THYROID STIM HORMONE: CPT

## 2025-04-28 PROCEDURE — 84439 ASSAY OF FREE THYROXINE: CPT

## 2025-05-01 ENCOUNTER — OFFICE VISIT (OUTPATIENT)
Facility: LOCATION | Age: 50
End: 2025-05-01
Payer: COMMERCIAL

## 2025-05-01 ENCOUNTER — LAB ENCOUNTER (OUTPATIENT)
Dept: LAB | Age: 50
End: 2025-05-01
Attending: INTERNAL MEDICINE
Payer: COMMERCIAL

## 2025-05-01 VITALS
BODY MASS INDEX: 28.7 KG/M2 | HEIGHT: 63 IN | SYSTOLIC BLOOD PRESSURE: 98 MMHG | WEIGHT: 162 LBS | DIASTOLIC BLOOD PRESSURE: 60 MMHG | HEART RATE: 85 BPM

## 2025-05-01 DIAGNOSIS — R53.83 FATIGUE, UNSPECIFIED TYPE: ICD-10-CM

## 2025-05-01 DIAGNOSIS — R79.89 LOW VITAMIN D LEVEL: ICD-10-CM

## 2025-05-01 DIAGNOSIS — E66.3 OVERWEIGHT (BMI 25.0-29.9): ICD-10-CM

## 2025-05-01 DIAGNOSIS — R00.2 PALPITATIONS: ICD-10-CM

## 2025-05-01 DIAGNOSIS — R00.2 PALPITATION: ICD-10-CM

## 2025-05-01 DIAGNOSIS — E05.90 HYPERTHYROIDISM: Primary | ICD-10-CM

## 2025-05-01 DIAGNOSIS — E07.9 THYROID DISEASE: ICD-10-CM

## 2025-05-01 DIAGNOSIS — L60.3 BRITTLE NAILS: ICD-10-CM

## 2025-05-01 DIAGNOSIS — E05.90 HYPERTHYROIDISM: ICD-10-CM

## 2025-05-01 LAB
T3FREE SERPL-MCNC: 6.33 PG/ML (ref 2.4–4.2)
VIT D+METAB SERPL-MCNC: 21.2 NG/ML (ref 30–100)

## 2025-05-01 PROCEDURE — 99205 OFFICE O/P NEW HI 60 MIN: CPT | Performed by: INTERNAL MEDICINE

## 2025-05-01 PROCEDURE — 86376 MICROSOMAL ANTIBODY EACH: CPT | Performed by: INTERNAL MEDICINE

## 2025-05-01 PROCEDURE — 36415 COLL VENOUS BLD VENIPUNCTURE: CPT | Performed by: INTERNAL MEDICINE

## 2025-05-01 PROCEDURE — 83520 IMMUNOASSAY QUANT NOS NONAB: CPT

## 2025-05-01 PROCEDURE — 86800 THYROGLOBULIN ANTIBODY: CPT | Performed by: INTERNAL MEDICINE

## 2025-05-01 PROCEDURE — 82306 VITAMIN D 25 HYDROXY: CPT

## 2025-05-01 PROCEDURE — 84445 ASSAY OF TSI GLOBULIN: CPT | Performed by: INTERNAL MEDICINE

## 2025-05-01 PROCEDURE — 84481 FREE ASSAY (FT-3): CPT | Performed by: INTERNAL MEDICINE

## 2025-05-01 RX ORDER — PROPRANOLOL HYDROCHLORIDE 10 MG/1
10 TABLET ORAL 3 TIMES DAILY
Qty: 270 TABLET | Refills: 0 | Status: SHIPPED | OUTPATIENT
Start: 2025-05-01

## 2025-05-01 NOTE — PATIENT INSTRUCTIONS
Labs today   Labs and RTC in 1-2 mo   Will start methimazole if needed   Propranolol 10 mg 2-3x/day     Not on birth contro  l we discussed to avoid pregnancy   [email or web address]  Discussed with patient possible dx, treatment options, [facility] vs surgery vs meds. Discussed thyroid and pregnancy (if applicable), wt gain, eye disease, and SE of meds and [facility]. Methimazole may cause significant bone marrow depression; the most severe manifestation is agranulocytosis. May also cause Hepatotoxicity (including acute liver failure) Symptoms suggestive of hepatic dysfunction (eg, anorexia, pruritus, right upper quadrant pain) should prompt evaluation. If you have nausea, vomiting, abdominal pain, jaundice- yellow skin or eye color-, dark urine, light stool color, fever, sore throat or infection, call us or the PCP.  Remission rate of ~ 40% with use of antithyroid drugs for 1-1.5 years, their side effects, monitoring, and follow-up issues, specifically agranulocytosis, liver toxicity, anaphylaxis, rash, lupus like syndrome, metallic taste in the mouth, and joint aches. We discussed that patient should discontinue methimazole at the earliest sign of a fever, sore throat, or other infection, should call our office and have WBC count with differential done. The drug should be held until the result is available.     If applicable, Hyperthyroid pregnancy counseling. General counseling on contraception (Z30.09).  We discussed in details the adverse effect of uncontrolled hyperthyroidism on pregnancy    Also discussed the risk of Methimazole on the fetus.   Please notify us if you are pregnant or you are planning to get pregnant.   Print out was given to the Pt

## 2025-05-01 NOTE — PROGRESS NOTES
New Patient Evaluation - History and Physical    CONSULT - Reason for Visit:    hyperthyroid  Requesting Physician:   .Kaycee Dennison MD  No referring provider defined for this encounter.    CHIEF COMPLAINT:    Chief Complaint   Patient presents with    Hypothyroidism     F/u      Last completed office visit: Visit date not found   Next scheduled Follow up:   Future Appointments   Date Time Provider Department Center   5/1/2025  9:30 AM Leno Nolasco MD [facility]      HISTORY OF PRESENT ILLNESS:   Kit Gomez is a 50 year old female who presents with     hyperthyroid  She c/o palpitations, wt gain, anxiety and fatigue,    She was on meds like phentermine. She stopped in Dec 2024       Compression symptoms: denied except the underlined ones SOB, dysphagia, odynophagia, change in voice, hoarseness, neck pain or neck mass.     The patient endorses the bolded symptoms: intolerance to cold, constipation, decreased lacrimation, fatigue; anxiety, heat intolerance, insomnia, tremors, wt loss, wt gain, irregular menses, lid lag, palpitations, proptosis, thinning hair; dyspnea, difficulty breathing when lying down, dysphagia, sensation of food getting stuck in the throat, choking sensation when lying flat, pooling of saliva, dysphonia, voice changes, hoarseness; none.    +vision changes.      Hair and skin: dry skin, nails break easily   Menstrual hx: irregular. Patient's last menstrual period was 01/09/2025 (exact date).   Neck surgery: No  Neck radiation: No   Biotin: no  Turmeric:no  Family history of thyroid cancer in 1st degree relatives: no     Mother with hyperthyroid   Breast cancer in mother in 70 and sister in 55       ASSESSMENT AND PLAN:  Kit Gomez is a 50 year old female who presents with  hyperthyroid, anxiety, palpitation and low vit D  She is on BB but still has symptoms   No goiter on exam   Not on OCP.     Plan    Labs today   Labs and RTC in 1-2 mo   Will start methimazole if  needed   Propranolol 10 mg 2-3x/day     Not on birth contro  l we discussed to avoid pregnancy   [email or web address]  Discussed with patient possible dx, treatment options, [facility] vs surgery vs meds. Discussed thyroid and pregnancy (if applicable), wt gain, eye disease, and SE of meds and [facility]. Methimazole may cause significant bone marrow depression; the most severe manifestation is agranulocytosis. May also cause Hepatotoxicity (including acute liver failure) Symptoms suggestive of hepatic dysfunction (eg, anorexia, pruritus, right upper quadrant pain) should prompt evaluation. If you have nausea, vomiting, abdominal pain, jaundice- yellow skin or eye color-, dark urine, light stool color, fever, sore throat or infection, call us or the PCP.  Remission rate of ~ 40% with use of antithyroid drugs for 1-1.5 years, their side effects, monitoring, and follow-up issues, specifically agranulocytosis, liver toxicity, anaphylaxis, rash, lupus like syndrome, metallic taste in the mouth, and joint aches. We discussed that patient should discontinue methimazole at the earliest sign of a fever, sore throat, or other infection, should call our office and have WBC count with differential done. The drug should be held until the result is available.     If applicable, Hyperthyroid pregnancy counseling. General counseling on contraception (Z30.09).  We discussed in details the adverse effect of uncontrolled hyperthyroidism on pregnancy    Also discussed the risk of Methimazole on the fetus.   Please notify us if you are pregnant or you are planning to get pregnant.   Print out was given to the Pt    PAST MEDICAL HISTORY:   History reviewed. No pertinent past medical history.  Hyperthyroid   PAST SURGICAL HISTORY:   Past Surgical History:   Procedure Laterality Date          Cholecystectomy         CURRENT MEDICATIONS:     propranolol 10 MG Oral Tab Take 1 tablet (10 mg total) by mouth 3 (three) times daily. 270 tablet 0     escitalopram (LEXAPRO) 10 MG Oral Tab Take 1 tablet (10 mg total) by mouth daily. 30 tablet 2       ALLERGIES:  No Known Allergies    SOCIAL HISTORY:    Social History     Socioeconomic History    Marital status:    Tobacco Use    Smoking status: Never    Smokeless tobacco: Never   Vaping Use    Vaping status: Never Used   Substance and Sexual Activity    Alcohol use: Yes     Comment: occ    Drug use: Never     HR   Smoking no  Marijuana no        FAMILY HISTORY:   Family History   Problem Relation Age of Onset    Cancer Mother         breast    Breast Cancer Mother 55    Cancer Father         skin    Breast Cancer Sister 54        BREAST CA - genetic testing Neg    Other (double masectomy) Sister     Ovarian Cancer Neg     Pancreatic Cancer Neg     Prostate Cancer Neg     Colon Cancer Neg     Uterine Cancer Neg         REVIEW OF SYSTEMS:  All negative other than HPI    PHYSICAL EXAM:   Height: 5' 3\" (160 cm) (05/01 0926)  Weight: 162 lb (73.5 kg) (05/01 0926)  BSA (Calculated - sq m): 1.77 sq meters (05/01 0926)  Pulse: 85 (05/01 0926)  BP: 98/60 (05/01 0926)  Temp: --  Do Not Use - Resp Rate: --  SpO2: --    Body mass index is 28.7 kg/m².    CONSTITUTIONAL:  Awake and alert. Age appropriate, good hygiene not in acute distress. Well-nourished and well developed. no acute distress   PSYCH:    Normal mood and affect,   cooperative  Neuro: speech is clear. Awake, alert, no aphasia, no facial asymmetry,    EYES:  No proptosis, no ptosis, conjunctiva normal  ENT:  Normocephalic, atraumatic  Eye: EOMI, normal lids, no discharge,    No rhinorrhea, moist oral mucosa  Neck: full range of motion  Neck/Thyroid: neck inspection: normal, No scar, No goiter   LUNGS:  No acute respiratory distress, non-labored respiration. Speaking full sentences  CARDIOVASCULAR:  regular rate   ABDOMEN:  No abdominal pain.   SKIN:  Skin is dry, no obvious rashes or lesions  EXTREMITIES: no gross abnormality   MSK: Moves extremities  spontaneously.       DATA:     Pertinent data reviewed  No results found.      No results for input(s): \"TSH\", \"T4F\", \"T3F\", \"THYP\" in the last 72 hours.    TSH   Date Value Ref Range Status   04/28/2025 <0.010 (L) 0.550 - 4.780 uIU/mL Final     Lab Results   Component Value Date    A1C 5.3 12/10/2024           No results for input(s): \"TSH\", \"T4F\", \"T3F\", \"THYP\" in the last 72 hours.    No results found.    Orders Placed This Encounter   Procedures    TSH RECEPTOR ANTIBODY (TBII)    Thyroid Stimulating Immunoglobulin    Thyroid peroxidase & thyroglobulin ab    Free T4, (Free Thyroxine)    Free T3 (Triiodothryronine)    Vitamin D [E]     Orders Placed This Encounter    TSH RECEPTOR ANTIBODY (TBII)     Release to patient:   Immediate    Thyroid Stimulating Immunoglobulin     Release to patient:   Immediate    Thyroid peroxidase & thyroglobulin ab     Release to patient:   Immediate    Free T4, (Free Thyroxine)     Standing Status:   Future     Expected Date:   6/6/2025     Expiration Date:   5/1/2026     Release to patient:   Immediate    Free T3 (Triiodothryronine)     Release to patient:   Immediate    Vitamin D [E]     Standing Status:   Future     Expected Date:   5/1/2025     Expiration Date:   5/1/2026     Please pick the scenario that best fits the purpose for ordering this test:   General Screening/Vit D deficiency (25-Hydroxy)     Release to patient:   Immediate    propranolol 10 MG Oral Tab     Sig: Take 1 tablet (10 mg total) by mouth 3 (three) times daily.     Dispense:  270 tablet     Refill:  0     **Patient requests 90 days supply**          This is a specialized patient consultation in endocrinology and required comprehensive review of prior records, as well as current evaluation, with time required for consideration of complex endocrine issues and consultation. For this visit, I personally interviewed the patient, and family member if accompanied, performed the pertinent parts of the history and  physical examination. ROS included screening for appropriate endocrine conditions.   Today's diagnosis and plan were reviewed in detail with the patient who states understanding and agrees with plan. I discussed with the patient possible diagnosis, differential diagnosis, need for work up, treatment options, alternatives and side effects.     Please see note for details about time spent which includes:   · pre-visit preparation  · reviewing records  · face to face time with the patient   · timely documentation of the encounter  · ordering medications/tests  · communication with care team  · care coordination    I appreciate the opportunity to be part of your patient's medical care and will keep you, as the referring and primary physicians, informed about the care of your patient. Please feel free to contact me should you have any questions.    The 21st Century Cures Act makes medical notes like these available to patients in the interest of transparency. Please be advised this is a medical document. Medical documents are intended to carry relevant information, facts as evident, and the clinical opinion of the practitioner. The medical note is intended as peer to peer communication and may appear blunt or direct. It is written in medical language and may contain abbreviations or verbiage that are unfamiliar.   Leno Nolasco MD         Stable

## 2025-05-02 LAB
THYROGLOB SERPL-MCNC: 30 U/ML (ref ?–60)
THYROPEROXIDASE AB SERPL-ACNC: <28 U/ML (ref ?–60)
THYROTROPIN REC AB: 1.85 IU/L

## 2025-05-04 LAB — THY STIM IMMUNO: 2.13 IU/L

## 2025-05-05 ENCOUNTER — PATIENT MESSAGE (OUTPATIENT)
Facility: LOCATION | Age: 50
End: 2025-05-05

## 2025-06-09 ENCOUNTER — HOSPITAL ENCOUNTER (OUTPATIENT)
Age: 50
Discharge: HOME | End: 2025-06-09
Payer: COMMERCIAL

## 2025-06-09 ENCOUNTER — APPOINTMENT (OUTPATIENT)
Dept: GENERAL RADIOLOGY | Age: 50
End: 2025-06-09
Attending: PHYSICIAN ASSISTANT
Payer: COMMERCIAL

## 2025-06-09 VITALS
OXYGEN SATURATION: 97 % | WEIGHT: 175 LBS | BODY MASS INDEX: 31.01 KG/M2 | HEART RATE: 74 BPM | HEIGHT: 63 IN | RESPIRATION RATE: 18 BRPM | DIASTOLIC BLOOD PRESSURE: 101 MMHG | SYSTOLIC BLOOD PRESSURE: 144 MMHG | TEMPERATURE: 98 F

## 2025-06-09 DIAGNOSIS — M54.9 ACUTE UPPER BACK PAIN: ICD-10-CM

## 2025-06-09 DIAGNOSIS — R07.9 ACUTE CHEST PAIN: Primary | ICD-10-CM

## 2025-06-09 LAB
#MXD IC: 0.7 X10ˆ3/UL (ref 0.1–1)
B-HCG UR QL: NEGATIVE
BUN BLD-MCNC: 11 MG/DL (ref 7–18)
CHLORIDE BLD-SCNC: 100 MMOL/L (ref 98–112)
CO2 BLD-SCNC: 26 MMOL/L (ref 21–32)
CREAT BLD-MCNC: 0.4 MG/DL (ref 0.55–1.02)
EGFRCR SERPLBLD CKD-EPI 2021: 121 ML/MIN/1.73M2 (ref 60–?)
GLUCOSE BLD-MCNC: 88 MG/DL (ref 70–99)
HCT VFR BLD AUTO: 38.5 % (ref 35–48)
HCT VFR BLD CALC: 43 % (ref 34–50)
HGB BLD-MCNC: 13 G/DL (ref 12–16)
ISTAT IONIZED CALCIUM FOR CHEM 8: 1.23 MMOL/L (ref 1.12–1.32)
LYMPHOCYTES # BLD AUTO: 2.6 X10ˆ3/UL (ref 1–4)
LYMPHOCYTES NFR BLD AUTO: 36.4 %
MCH RBC QN AUTO: 29.3 PG (ref 26–34)
MCHC RBC AUTO-ENTMCNC: 33.8 G/DL (ref 31–37)
MCV RBC AUTO: 86.7 FL (ref 80–100)
MIXED CELL %: 9.5 %
NEUTROPHILS # BLD AUTO: 3.9 X10ˆ3/UL (ref 1.5–7.7)
NEUTROPHILS NFR BLD AUTO: 54.1 %
PLATELET # BLD AUTO: 244 X10ˆ3/UL (ref 150–450)
POTASSIUM BLD-SCNC: 3.7 MMOL/L (ref 3.6–5.1)
RBC # BLD AUTO: 4.44 X10ˆ6/UL (ref 3.8–5.3)
SODIUM BLD-SCNC: 139 MMOL/L (ref 136–145)
TROPONIN I BLD-MCNC: <0.02 NG/ML (ref ?–0.05)
WBC # BLD AUTO: 7.2 X10ˆ3/UL (ref 4–11)

## 2025-06-09 PROCEDURE — 84484 ASSAY OF TROPONIN QUANT: CPT | Performed by: PHYSICIAN ASSISTANT

## 2025-06-09 PROCEDURE — 93000 ELECTROCARDIOGRAM COMPLETE: CPT | Performed by: PHYSICIAN ASSISTANT

## 2025-06-09 PROCEDURE — 81025 URINE PREGNANCY TEST: CPT | Performed by: PHYSICIAN ASSISTANT

## 2025-06-09 PROCEDURE — 85025 COMPLETE CBC W/AUTO DIFF WBC: CPT | Performed by: PHYSICIAN ASSISTANT

## 2025-06-09 PROCEDURE — 80047 BASIC METABLC PNL IONIZED CA: CPT | Performed by: PHYSICIAN ASSISTANT

## 2025-06-09 PROCEDURE — 99204 OFFICE O/P NEW MOD 45 MIN: CPT | Performed by: PHYSICIAN ASSISTANT

## 2025-06-09 PROCEDURE — 71046 X-RAY EXAM CHEST 2 VIEWS: CPT | Performed by: PHYSICIAN ASSISTANT

## 2025-06-09 NOTE — ED INITIAL ASSESSMENT (HPI)
Pt presents to the IC with c/o left mid/upper back pain at the bra line to the scapula. Pt notes the pain is intermittent. Denies SOB. Pain is managed with tylenol and motrin, last taken at 2pm. Pt also reports mild left sided chest discomfort intermittently, that goes through to the back pain area.

## 2025-06-09 NOTE — ED PROVIDER NOTES
Patient Seen in: [facility]        History  Chief Complaint   Patient presents with    Back Pain     Entered by patient     Stated Complaint: Back Pain    Subjective:   HPI            Patient is a 50-year-old female with past medical history of hypothyroidism, anxiety that presents to immediate care due to left upper back pain.  Patient states that pain began gradually yesterday afternoon.  Denies acute injury or fall.  States that pain radiates to her left chest.  Denies pleuritic or exertional chest pain.  States pain is worse with range of motion of the left arm.  Denies having symptoms like this previously.      Objective:     History reviewed. No pertinent past medical history.           Past Surgical History:   Procedure Laterality Date          Cholecystectomy                  Social History     Socioeconomic History    Marital status:    Tobacco Use    Smoking status: Never    Smokeless tobacco: Never   Vaping Use    Vaping status: Never Used   Substance and Sexual Activity    Alcohol use: Yes     Comment: occ    Drug use: Never     Social Drivers of Health     Food Insecurity: No Food Insecurity (2025)    NCSS - Food Insecurity     Worried About Running Out of Food in the Last Year: No     Ran Out of Food in the Last Year: No   Transportation Needs: No Transportation Needs (2025)    NCSS - Transportation     Lack of Transportation: No   Housing Stability: Not At Risk (2025)    NCSS - Housing/Utilities     Has Housing: Yes     Worried About Losing Housing: No     Unable to Get Utilities: No              Review of Systems    Positive for stated complaint: Back Pain  Other systems are as noted in HPI.  Constitutional and vital signs reviewed.      All other systems reviewed and negative except as noted above.                  Physical Exam    ED Triage Vitals [25 1722]   BP (!) 144/101   Pulse 74   Resp 18   Temp 97.8 °F (36.6 °C)   Temp src Oral   SpO2 97 %    O2 Device None (Room air)       Current Vitals:   Vital Signs  BP: (!) 144/101  Pulse: 74  Resp: 18  Temp: 97.8 °F (36.6 °C)  Temp src: Oral    Oxygen Therapy  SpO2: 97 %  O2 Device: None (Room air)            Physical Exam  Vital signs reviewed. Nursing note reviewed.  Constitutional: Well-developed. Well-nourished. In no acute distress  HENT: Mucous membranes moist.   EYES: No scleral icterus or conjunctival injection.  NECK: Full ROM. Supple.   CARDIAC: Normal rate. Normal S1/ S2. 2+ distal pulses. No edema  PULM/CHEST: Clear to auscultation bilaterally. No wheezes  ABD: Soft, non-tender, non-distended.   : No CVA tenderness.  RECTAL: deferred  Extremities: Full ROM of left shoulder.  Radial pulse 2+ bilaterally.  No overlying edema ecchymosis erythema or warmth.  NEURO: Awake, alert, following commands, moving extremities, answering questions.   SKIN: Warm and dry. No rash or lesions.  PSYCH: Normal judgment. Normal affect.            ED Course  Labs Reviewed   POCT ISTAT CHEM8 CARTRIDGE - Abnormal; Notable for the following components:       Result Value    ISTAT Creatinine 0.40 (*)     All other components within normal limits   POCT PREGNANCY URINE - Normal   ISTAT TROPONIN - Normal   POCT CBC     EKG    Rate, intervals and axes as noted on EKG Report.  Rate: 65bpm  Rhythm: Sinus Rhythm  Reading: No ectopy, no ST segment changes, no signs of acute ischemia                              MDM     Patient is a 50-year-old female who presents to immediate care due to left upper back pain radiating to left chest x 2 days.  Patient arrives with stable vitals.  Physical exam pain elicited with range of motion of left shoulder with no obvious deformity or edema along with lungs clear to auscultation no lower leg edema.  Will order EKG chest x-ray lab work including troponin BMP, CBC.  History given by patient.  Care discussed with attending Dr. Blackman    1800  Labs and imaging reassuring, reviewed with patient.   Chest x-ray showing no cardiopulmonary abnormality.  Negative troponin, CBC BMP unremarkable showing no leukocytosis or electrolyte derangement.  Prescribed RAAM ACE.  Most likely musculoskeletal pain as patient's pain is elicited with range of motion of left shoulder.  Discussed RICE, take Tylenol ibuprofen as needed for pain.  ED precautions discussed including worsening pain, hemoptysis persistent chest pain.  Patient agreeable to plan all questions answered.    Medical Decision Making      Disposition and Plan     Clinical Impression:  1. Acute chest pain    2. Acute upper back pain         Disposition:  Discharge  6/9/2025  6:08 pm    Follow-up:  Elsa Dennison MD  [address] [place] [id number]  [phone]    Call             Medications Prescribed:  Discharge Medication List as of 6/9/2025  6:11 PM                Supplementary Documentation:

## 2025-06-10 LAB
ATRIAL RATE: 65 BPM
P AXIS: 16 DEGREES
P-R INTERVAL: 128 MS
Q-T INTERVAL: 392 MS
QRS DURATION: 88 MS
QTC CALCULATION (BEZET): 407 MS
R AXIS: 23 DEGREES
T AXIS: 32 DEGREES
VENTRICULAR RATE: 65 BPM

## 2025-06-12 DIAGNOSIS — F41.9 ANXIETY DISORDER, UNSPECIFIED TYPE: ICD-10-CM

## 2025-06-13 RX ORDER — ESCITALOPRAM OXALATE 10 MG/1
10 TABLET ORAL DAILY
Qty: 90 TABLET | Refills: 3 | Status: SHIPPED | OUTPATIENT
Start: 2025-06-13

## 2025-06-24 ENCOUNTER — PATIENT MESSAGE (OUTPATIENT)
Facility: LOCATION | Age: 50
End: 2025-06-24

## 2025-06-24 DIAGNOSIS — E05.90 HYPERTHYROIDISM: Primary | ICD-10-CM

## 2025-06-24 NOTE — TELEPHONE ENCOUNTER
ASSESSMENT AND PLAN:  Kit Gomez is a 50 year old female who presents with  hyperthyroid, anxiety, palpitation and low vit D  She is on BB but still has symptoms   No goiter on exam   Not on OCP.      Plan    Labs today   Labs and RTC in 1-2 mo   Will start methimazole if needed   Propranolol 10 mg 2-3x/day      Not on birth contro  l we discussed to avoid pregnancy

## 2025-06-25 ENCOUNTER — LAB ENCOUNTER (OUTPATIENT)
Dept: LAB | Facility: HOSPITAL | Age: 50
End: 2025-06-25
Attending: INTERNAL MEDICINE
Payer: COMMERCIAL

## 2025-06-25 DIAGNOSIS — E07.9 DISEASE OF THYROID GLAND: ICD-10-CM

## 2025-06-25 DIAGNOSIS — L60.3 MEDIAN CANALIFORM NAIL DYSTROPHY: ICD-10-CM

## 2025-06-25 DIAGNOSIS — R53.83 FATIGUE: ICD-10-CM

## 2025-06-25 DIAGNOSIS — E66.3 SEVERELY OVERWEIGHT: ICD-10-CM

## 2025-06-25 DIAGNOSIS — E05.90 HYPERTHYROIDISM: ICD-10-CM

## 2025-06-25 DIAGNOSIS — R00.2 PALPITATIONS: ICD-10-CM

## 2025-06-25 DIAGNOSIS — R79.89 HYPOURICEMIA: ICD-10-CM

## 2025-06-25 DIAGNOSIS — E05.90 PRETIBIAL MYXEDEMA: Primary | ICD-10-CM

## 2025-06-25 LAB — T4 FREE SERPL-MCNC: 1.1 NG/DL (ref 0.8–1.7)

## 2025-06-25 PROCEDURE — 84439 ASSAY OF FREE THYROXINE: CPT

## 2025-06-25 PROCEDURE — 36415 COLL VENOUS BLD VENIPUNCTURE: CPT

## 2025-06-26 ENCOUNTER — OFFICE VISIT (OUTPATIENT)
Facility: LOCATION | Age: 50
End: 2025-06-26
Payer: COMMERCIAL

## 2025-06-26 VITALS
WEIGHT: 183 LBS | HEART RATE: 99 BPM | HEIGHT: 63 IN | BODY MASS INDEX: 32.43 KG/M2 | SYSTOLIC BLOOD PRESSURE: 100 MMHG | DIASTOLIC BLOOD PRESSURE: 72 MMHG

## 2025-06-26 DIAGNOSIS — R63.5 WEIGHT GAIN: ICD-10-CM

## 2025-06-26 DIAGNOSIS — R00.2 PALPITATION: ICD-10-CM

## 2025-06-26 DIAGNOSIS — R53.83 FATIGUE, UNSPECIFIED TYPE: ICD-10-CM

## 2025-06-26 DIAGNOSIS — F41.9 ANXIETY: ICD-10-CM

## 2025-06-26 DIAGNOSIS — R79.89 LOW VITAMIN D LEVEL: ICD-10-CM

## 2025-06-26 DIAGNOSIS — E05.90 HYPERTHYROIDISM: ICD-10-CM

## 2025-06-26 DIAGNOSIS — E05.00 GRAVES DISEASE: Primary | ICD-10-CM

## 2025-06-26 PROCEDURE — 99214 OFFICE O/P EST MOD 30 MIN: CPT | Performed by: INTERNAL MEDICINE

## 2025-06-26 RX ORDER — PROPRANOLOL HCL 20 MG
20 TABLET ORAL 2 TIMES DAILY
Qty: 60 TABLET | Refills: 2 | Status: SHIPPED | OUTPATIENT
Start: 2025-06-26 | End: 2025-09-24

## 2025-06-26 RX ORDER — METHIMAZOLE 5 MG/1
5 TABLET ORAL DAILY
Qty: 90 TABLET | Refills: 0 | Status: SHIPPED | OUTPATIENT
Start: 2025-06-26

## 2025-06-26 NOTE — PATIENT INSTRUCTIONS
Wt Readings from Last 6 Encounters:   06/26/25 183 lb (83 kg)   06/09/25 175 lb (79.4 kg)   05/01/25 162 lb (73.5 kg)   02/27/25 167 lb (75.8 kg)   01/09/25 154 lb (69.9 kg)   12/10/24 157 lb (71.2 kg)      Latest Reference Range & Units 05/01/25 10:28   Thy Stim Immuno 0.00 - 0.55 IU/L 2.13 (H)   Thyrotropin Rec Ab 0.00 - 1.75 IU/L 1.85 (H)   (H): Data is abnormally high     Latest Reference Range & Units 06/25/25 18:54   T4,Free (Direct) 0.8 - 1.7 ng/dL 1.1     Labs and RTC in 1-2 mo   Methimazole 5 mg every day    Propranolol 20 mg 2-3x/day   Will refer to get eye exam for Graves eye disease. Use eye drops now   Will refer to therapist for anxiety  Not on birth contro  l we discussed to avoid pregnancy   [email or web address]  Discussed with patient possible dx, treatment options, [facility] vs surgery vs meds. Discussed thyroid and pregnancy (if applicable), wt gain, eye disease, and SE of meds and [facility]. Methimazole may cause significant bone marrow depression; the most severe manifestation is agranulocytosis. May also cause Hepatotoxicity (including acute liver failure) Symptoms suggestive of hepatic dysfunction (eg, anorexia, pruritus, right upper quadrant pain) should prompt evaluation. If you have nausea, vomiting, abdominal pain, jaundice- yellow skin or eye color-, dark urine, light stool color, fever, sore throat or infection, call us or the PCP.  Remission rate of ~ 40% with use of antithyroid drugs for 1-1.5 years, their side effects, monitoring, and follow-up issues, specifically agranulocytosis, liver toxicity, anaphylaxis, rash, lupus like syndrome, metallic taste in the mouth, and joint aches. We discussed that patient should discontinue methimazole at the earliest sign of a fever, sore throat, or other infection, should call our office and have WBC count with differential done. The drug should be held until the result is available.     If applicable, Hyperthyroid pregnancy counseling. General counseling on  contraception (Z30.09).  We discussed in details the adverse effect of uncontrolled hyperthyroidism on pregnancy    Also discussed the risk of Methimazole on the fetus.   Please notify us if you are pregnant or you are planning to get pregnant.   Print out was given to the Pt

## 2025-06-26 NOTE — PROGRESS NOTES
Reason for Visit:   Graves ,  hyperthyroid  Requesting Physician:   ..Elsa Dennison MD  No referring provider defined for this encounter.    CHIEF COMPLAINT:    Chief Complaint   Patient presents with    Hypothyroidism     F/u      Last completed office visit: Visit date not found   Next scheduled Follow up:   Future Appointments   Date Time Provider Department Center   5/1/2025  9:30 AM Leno Nolasco MD [facility]      HISTORY OF PRESENT ILLNESS:   Kit Gomez is a 50 year old female who presents with   Graves and  hyperthyroid  TSI high 5/2025   Started MMI and BB 5/2025   Now on MMI 5 mg day and propranolol 10 mg TID    She went to urgent care for chest tightness and had w/u   Palpitations improved with meds  Still has anxiety  She c/o eyelid swelling and fatigue,   Not a smoker.     Menstrual hx: irregular. Patient's last menstrual period was 06/02/2025 (exact date).   Neck surgery: No  Neck radiation: No   Biotin: no  Turmeric:no  Family history of thyroid cancer in 1st degree relatives: no     Mother with hyperthyroid   Breast cancer in mother in 70 and sister in 55       ASSESSMENT AND PLAN:  Kit Gomez is a 50 year old female who presents with Graves', hyperthyroid, anxiety, palpitation and low vit D  She is on MMI 5 mg /day and propranolol 10 mg TID    Clinically nonspecific symptoms      Not on OCP.     Plan     Labs and RTC in 1-2 mo   Methimazole 5 mg every day    Propranolol 20 mg 2-3x/day   Will refer to get eye exam for Graves eye disease. Use eye drops now   Will refer to therapist for anxiety        [email or web address]  Discussed with patient possible dx, treatment options, [facility] vs surgery vs meds. Discussed thyroid and pregnancy (if applicable), wt gain, eye disease, and SE of meds and [facility]. Methimazole may cause significant bone marrow depression; the most severe manifestation is agranulocytosis. May also cause Hepatotoxicity (including acute liver failure) Symptoms suggestive of  hepatic dysfunction (eg, anorexia, pruritus, right upper quadrant pain) should prompt evaluation. If you have nausea, vomiting, abdominal pain, jaundice- yellow skin or eye color-, dark urine, light stool color, fever, sore throat or infection, call us or the PCP.  Remission rate of ~ 40% with use of antithyroid drugs for 1-1.5 years, their side effects, monitoring, and follow-up issues, specifically agranulocytosis, liver toxicity, anaphylaxis, rash, lupus like syndrome, metallic taste in the mouth, and joint aches. We discussed that patient should discontinue methimazole at the earliest sign of a fever, sore throat, or other infection, should call our office and have WBC count with differential done. The drug should be held until the result is available.     If applicable, Hyperthyroid pregnancy counseling. General counseling on contraception (Z30.09).  We discussed in details the adverse effect of uncontrolled hyperthyroidism on pregnancy    Also discussed the risk of Methimazole on the fetus.   Please notify us if you are pregnant or you are planning to get pregnant.   Print out was given to the Pt    PAST MEDICAL HISTORY:   History reviewed. No pertinent past medical history.  Hyperthyroid   PAST SURGICAL HISTORY:   Past Surgical History:   Procedure Laterality Date          Cholecystectomy         CURRENT MEDICATIONS:     propranolol 20 MG Oral Tab Take 1 tablet (20 mg total) by mouth 2 (two) times daily. 60 tablet 2    methIMAzole 5 MG Oral Tab Take 1 tablet (5 mg total) by mouth daily. 90 tablet 0    escitalopram 10 MG Oral Tab Take 1 tablet (10 mg total) by mouth daily. 90 tablet 3       ALLERGIES:  No Known Allergies    SOCIAL HISTORY:    Social History     Socioeconomic History    Marital status:    Tobacco Use    Smoking status: Never    Smokeless tobacco: Never   Vaping Use    Vaping status: Never Used   Substance and Sexual Activity    Alcohol use: Yes     Comment: occ    Drug use:  Never     HR   Smoking no  Marijuana no     FAMILY HISTORY:   Family History   Problem Relation Age of Onset    Cancer Mother         breast    Breast Cancer Mother 55    Cancer Father         skin    Breast Cancer Sister 54        BREAST CA - genetic testing Neg    Other (double masectomy) Sister     Ovarian Cancer Neg     Pancreatic Cancer Neg     Prostate Cancer Neg     Colon Cancer Neg     Uterine Cancer Neg       PHYSICAL EXAM:   Height: 5' 3\" (160 cm) (06/26 1050)  Weight: 183 lb (83 kg) (06/26 1050)  BSA (Calculated - sq m): 1.86 sq meters (06/26 1050)  Pulse: 99 (06/26 1050)  BP: 100/72 (06/26 1050)  Temp: --  Do Not Use - Resp Rate: --  SpO2: --    Body mass index is 32.42 kg/m².     DATA:     Pertinent data reviewed   Recent Labs     06/25/25  1854   T4F 1.1         XR CHEST PA + LAT CHEST ([id number])  Result Date: 6/9/2025  CONCLUSION: No acute cardiopulmonary abnormality.   Dictated by (CST): Jorge Luis Wallace MD on 6/09/2025 at 6:01 PM     Finalized by (CST): Jorge Luis Wallace MD on 6/09/2025 at 6:01 PM              Recent Labs     06/25/25  1854   T4F 1.1       TSH   Date Value Ref Range Status   04/28/2025 <0.010 (L) 0.550 - 4.780 uIU/mL Final     Lab Results   Component Value Date    A1C 5.3 12/10/2024           Recent Labs     06/25/25  1854   T4F 1.1       XR CHEST PA + LAT CHEST ([id number])  Result Date: 6/9/2025  CONCLUSION: No acute cardiopulmonary abnormality.   Dictated by (CST): Jorge Luis Wallace MD on 6/09/2025 at 6:01 PM     Finalized by (CST): Jorge Luis Wallace MD on 6/09/2025 at 6:01 PM            Orders Placed This Encounter   Procedures    Free T4, (Free Thyroxine)     Orders Placed This Encounter    Free T4, (Free Thyroxine)     Standing Status:   Future     Expected Date:   7/26/2025     Expiration Date:   6/26/2026     Release to patient:   Immediate    propranolol 20 MG Oral Tab     Sig: Take 1 tablet (20 mg total) by mouth 2 (two) times daily.     Dispense:  60 tablet     Refill:  2    methIMAzole 5  MG Oral Tab     Sig: Take 1 tablet (5 mg total) by mouth daily.     Dispense:  90 tablet     Refill:  0          This is a specialized patient consultation in endocrinology and required comprehensive review of prior records, as well as current evaluation, with time required for consideration of complex endocrine issues and consultation. For this visit, I personally interviewed the patient, and family member if accompanied, performed the pertinent parts of the history and physical examination. ROS included screening for appropriate endocrine conditions.   Today's diagnosis and plan were reviewed in detail with the patient who states understanding and agrees with plan. I discussed with the patient possible diagnosis, differential diagnosis, need for work up, treatment options, alternatives and side effects.     Please see note for details about time spent which includes:   · pre-visit preparation  · reviewing records  · face to face time with the patient   · timely documentation of the encounter  · ordering medications/tests  · communication with care team  · care coordination    I appreciate the opportunity to be part of your patient's medical care and will keep you, as the referring and primary physicians, informed about the care of your patient. Please feel free to contact me should you have any questions.    The 21st Century Cures Act makes medical notes like these available to patients in the interest of transparency. Please be advised this is a medical document. Medical documents are intended to carry relevant information, facts as evident, and the clinical opinion of the practitioner. The medical note is intended as peer to peer communication and may appear blunt or direct. It is written in medical language and may contain abbreviations or verbiage that are unfamiliar.   Leno Nolasco MD

## 2025-07-02 ENCOUNTER — TELEPHONE (OUTPATIENT)
Age: 50
End: 2025-07-02

## 2025-07-24 RX ORDER — METHIMAZOLE 5 MG/1
5 TABLET ORAL DAILY
Qty: 90 TABLET | Refills: 0 | Status: SHIPPED | OUTPATIENT
Start: 2025-07-24

## 2025-07-24 NOTE — TELEPHONE ENCOUNTER
Endocrine refill protocol for medications for hypothyroidism and hyperthyroidism    Protocol Criteria:  FAILED Reason: Abnormal labs    If all below requirements are met, send a 90-day supply with 1 refill per provider protocol.    Verify appointment with Endocrinology completed in the last 12 months or scheduled in the next 6 months.    Normal TSH result in the past 12 months   Review recent telephone encounters and mychart communications with patient to ensure a dose change has not occurred since last office visit that was not updated in the medication history list     Last completed office visit:6/26/2025 Leno Nolasco MD   Last completed telemed visit: Visit date not found  Next scheduled Follow up:   Future Appointments   Date Time Provider Department Center   9/18/2025 10:00 AM [facility]      Last TSH result:   TSH   Date Value Ref Range Status   04/28/2025 <0.010 (L) 0.550 - 4.780 uIU/mL Final

## 2025-08-13 ENCOUNTER — PATIENT MESSAGE (OUTPATIENT)
Facility: LOCATION | Age: 50
End: 2025-08-13